# Patient Record
Sex: FEMALE | Race: WHITE | Employment: FULL TIME | URBAN - METROPOLITAN AREA
[De-identification: names, ages, dates, MRNs, and addresses within clinical notes are randomized per-mention and may not be internally consistent; named-entity substitution may affect disease eponyms.]

---

## 2019-01-17 ENCOUNTER — HOSPITAL ENCOUNTER (EMERGENCY)
Age: 29
Discharge: HOME | End: 2019-01-17
Attending: EMERGENCY MEDICINE
Payer: COMMERCIAL

## 2019-01-17 VITALS
TEMPERATURE: 98.3 F | SYSTOLIC BLOOD PRESSURE: 124 MMHG | HEIGHT: 66 IN | HEART RATE: 80 BPM | DIASTOLIC BLOOD PRESSURE: 82 MMHG | BODY MASS INDEX: 23.49 KG/M2 | WEIGHT: 146.16 LBS | OXYGEN SATURATION: 99 % | RESPIRATION RATE: 16 BRPM

## 2019-01-17 DIAGNOSIS — S61.211A LACERATION OF LEFT INDEX FINGER WITHOUT FOREIGN BODY WITHOUT DAMAGE TO NAIL, INITIAL ENCOUNTER: Primary | ICD-10-CM

## 2019-01-17 PROCEDURE — 77030031132 HC SUT NYL COVD -A

## 2019-01-17 PROCEDURE — 75810000294 HC INTERM/LAYERED WND RPR

## 2019-01-17 PROCEDURE — 99283 EMERGENCY DEPT VISIT LOW MDM: CPT

## 2019-01-17 PROCEDURE — 74011250637 HC RX REV CODE- 250/637: Performed by: PHYSICIAN ASSISTANT

## 2019-01-17 PROCEDURE — 74011000250 HC RX REV CODE- 250: Performed by: PHYSICIAN ASSISTANT

## 2019-01-17 PROCEDURE — 77030018836 HC SOL IRR NACL ICUM -A

## 2019-01-17 RX ORDER — BUPIVACAINE HYDROCHLORIDE 5 MG/ML
5 INJECTION, SOLUTION EPIDURAL; INTRACAUDAL
Status: COMPLETED | OUTPATIENT
Start: 2019-01-17 | End: 2019-01-17

## 2019-01-17 RX ORDER — CEPHALEXIN 500 MG/1
500 CAPSULE ORAL 3 TIMES DAILY
Qty: 21 CAP | Refills: 0 | Status: SHIPPED | OUTPATIENT
Start: 2019-01-17 | End: 2019-01-22

## 2019-01-17 RX ORDER — CEPHALEXIN 500 MG/1
500 CAPSULE ORAL
Status: COMPLETED | OUTPATIENT
Start: 2019-01-17 | End: 2019-01-17

## 2019-01-17 RX ADMIN — BUPIVACAINE HYDROCHLORIDE 25 MG: 5 INJECTION, SOLUTION EPIDURAL; INTRACAUDAL; PERINEURAL at 13:10

## 2019-01-17 RX ADMIN — POLYMYXIN B SULFATE, BACITRACIN ZINC, NEOMYCIN SULFATE: 5000; 3.5; 4 OINTMENT TOPICAL at 13:28

## 2019-01-17 RX ADMIN — CEPHALEXIN 500 MG: 500 CAPSULE ORAL at 13:41

## 2019-01-17 NOTE — LETTER
[address] 
[address] [facility] [id number] 
[phone] Work/School Note Date: 1/17/2019 To Whom It May concern: 
 
Prashant Willoughby was seen and treated today in the emergency room by the following provider(s): 
Physician Assistant: Negro Burns PA-C. Prashant Willoughby may return to work on 1/17/19 Riana Conner Sincerely, Beth Ricks PA-C

## 2019-01-17 NOTE — DISCHARGE INSTRUCTIONS
We hope that we have addressed all of your medical concerns. The examination and treatment you received in the Emergency Department were for an emergent problem and were not intended as complete care. It is important that you follow up with your healthcare provider(s) for ongoing care. If your symptoms worsen or do not improve as expected, and you are unable to reach your usual health care provider(s), you should return to the Emergency Department. Today's healthcare is undergoing tremendous change, and patient satisfaction surveys are one of the many tools to assess the quality of medical care. You may receive a survey from the [employer] organization regarding your experience in the Emergency Department. I hope that your experience has been completely positive, particularly the medical care that I provided. As such, please participate in the survey; anything less than excellent does not meet my expectations or intentions. [facility] and [facility] participate in nationally recognized quality of care measures. If your blood pressure is greater than 120/80, as reported below, we urge that you seek medical care to address the potential of high blood pressure, commonly known as hypertension. Hypertension can be hereditary or can be caused by certain medical conditions, pain, stress, or \"white coat syndrome. \"       Please make an appointment with your health care provider(s) for follow up of your Emergency Department visit. VITALS:   Patient Vitals for the past 8 hrs:   Temp Pulse Resp BP SpO2   01/17/19 1159 98.3 °F (36.8 °C) 80 16 124/82 99 %   01/17/19 1119 -- 64 -- -- 98 %          Thank you for allowing us to provide you with medical care today. We realize that you have many choices for your emergency care needs. Please choose us in the future for any continued health care needs. [facility] [place]: [phone]            No results found for this or any previous visit (from the past 24 hour(s)). No results found. Patient Education        Cuts on the Hand Closed With Stitches: Care Instructions  Your Care Instructions    A cut on your hand can be on your fingers, your thumb, or the front or back of your hand. Sometimes a cut can injure the tendons, blood vessels, or nerves of your hand. The doctor used stitches to close the cut. Using stitches also helps the cut heal and reduces scarring. The doctor may have given you a splint to help prevent you from moving your hand, fingers, or thumb. If the cut went deep and through the skin, the doctor put in two layers of stitches. The deeper layer brings the deep part of the cut together. These stitches will dissolve and don't need to be removed. The stitches in the upper layer are the ones you see on the cut. You will probably have a bandage. You will need to have the stitches removed, usually in 7 to 14 days. The doctor may suggest that you see a hand specialist if the cut is very deep or if you have trouble moving your fingers or have less feeling in your hand. The doctor has checked you carefully, but problems can develop later. If you notice any problems or new symptoms, get medical treatment right away. Follow-up care is a key part of your treatment and safety. Be sure to make and go to all appointments, and call your doctor if you are having problems. It's also a good idea to know your test results and keep a list of the medicines you take. How can you care for yourself at home? · Keep the cut dry for the first 24 to 48 hours. After this, you can shower if your doctor okays it. Pat the cut dry. · Don't soak the cut, such as in a bathtub. Your doctor will tell you when it's safe to get the cut wet. · If your doctor told you how to care for your cut, follow your doctor's instructions.  If you did not get instructions, follow this general advice:  ? After the first 24 to 48 hours, wash around the cut with clean water 2 times a day. Don't use hydrogen peroxide or alcohol, which can slow healing. ? You may cover the cut with a thin layer of petroleum jelly, such as Vaseline, and a nonstick bandage. ? Apply more petroleum jelly and replace the bandage as needed. · Prop up the sore hand on a pillow anytime you sit or lie down during the next 3 days. Try to keep it above the level of your heart. This will help reduce swelling. · Avoid any activity that could cause your cut to reopen. · Do not remove the stitches on your own. Your doctor will tell you when to come back to have the stitches removed. · Be safe with medicines. Take pain medicines exactly as directed. ? If the doctor gave you a prescription medicine for pain, take it as prescribed. ? If you are not taking a prescription pain medicine, ask your doctor if you can take an over-the-counter medicine. When should you call for help? Call your doctor now or seek immediate medical care if:    · You have new pain, or your pain gets worse.     · The skin near the cut is cold or pale or changes color.     · You have tingling, weakness, or numbness near the cut.     · The cut starts to bleed, and blood soaks through the bandage. Oozing small amounts of blood is normal.     · You have trouble moving the area of the hand near the cut.     · You have symptoms of infection, such as:  ? Increased pain, swelling, warmth, or redness around the cut.  ? Red streaks leading from the cut.  ? Pus draining from the cut.  ? A fever.    Watch closely for changes in your health, and be sure to contact your doctor if:    · You do not get better as expected. Where can you learn more? Go to http://bay-karoline.info/. Enter T250 in the search box to learn more about \"Cuts on the Hand Closed With Stitches: Care Instructions. \"  Current as of: November 20, 2017  Content Version: 11.8  © 7021-1942 [employer]. Care instructions adapted under license by [employer] (which disclaims liability or warranty for this information). If you have questions about a medical condition or this instruction, always ask your healthcare professional. [address] any warranty or liability for your use of this information.

## 2019-01-17 NOTE — ED NOTES
1220: Patient escorted back to [address] [place] [address] waiting. Charge RN notified of patient need for room for laceration repair and need for finger soaking in [address] [place] [address] waiting.

## 2019-01-17 NOTE — ED PROVIDER NOTES
Leonor Hearn is a 34 y.o. female who presents ambulatory to ER with c/o laceration to left 2nd finger x last evening. States she was cutting cilantro when the knife slipped and she cut her left lateral 2nd finger right at the nailbed. Notes attempted to go to urgent care for sutures however they had just closed and wouldn't see her. States went to [facility] and got quick clot and applied it and held presure for 40mins with successful control of bleeding. States this AM at work started to bleed again and was advised to come to ED for eval. Td UTD. No other complaints. PCP: No primary care provider on file. PMHx significant for: No past medical history on file. PSHx significant for: No past surgical history on file. Allergies not on file There are no other complaints, changes or physical findings at this time. No past medical history on file. No past surgical history on file. No family history on file. Social History Socioeconomic History  Marital status: UNKNOWN Spouse name: Not on file  Number of children: Not on file  Years of education: Not on file  Highest education level: Not on file Social Needs  Financial resource strain: Not on file  Food insecurity - worry: Not on file  Food insecurity - inability: Not on file  Transportation needs - medical: Not on file  Transportation needs - non-medical: Not on file Occupational History  Not on file Tobacco Use  Smoking status: Not on file Substance and Sexual Activity  Alcohol use: Not on file  Drug use: Not on file  Sexual activity: Not on file Other Topics Concern  Not on file Social History Narrative  Not on file ALLERGIES: Patient has no allergy information on record. Review of Systems Constitutional: Negative. HENT: Negative. Eyes: Negative. Respiratory: Negative. Cardiovascular: Negative. Gastrointestinal: Negative. Genitourinary: Negative. Musculoskeletal: Negative. Skin: Positive for wound (laceration L 2nd finger). Neurological: Negative. Hematological: Negative. Psychiatric/Behavioral: Negative. All other systems reviewed and are negative. Vitals:  
 01/17/19 1119 Pulse: 64 SpO2: 98% Physical Exam  
Constitutional: She is oriented to person, place, and time. She appears well-developed and well-nourished. No distress. HENT:  
Head: Normocephalic and atraumatic. Neck: Normal range of motion. Cardiovascular: Intact distal pulses and normal pulses. Musculoskeletal: Normal range of motion. She exhibits no edema or tenderness. Neurological: She is alert and oriented to person, place, and time. She has normal strength. No sensory deficit. Skin: Skin is warm and dry. No rash noted. She is not diaphoretic. No erythema. No pallor. approx 1cm linear laceration to lateral aspect L 2nd finger extending from lateral aspect of nailbed around to palmar aspect overlying distal phalanx. Bleeding controlled. Several large pieces of quick clot embedded in qound. NVI distally, brisk cap refill, FROM all joints L 2nd digit and hand. Psychiatric: She has a normal mood and affect. Her behavior is normal.  
Nursing note and vitals reviewed. MDM Number of Diagnoses or Management Options Laceration of left index finger without foreign body without damage to nail, initial encounter:  
Diagnosis management comments: DDx: laceration, retained FB, secondary infection Amount and/or Complexity of Data Reviewed Discuss the patient with other providers: yes Patient Progress Patient progress: improved Procedures Procedure Note - Digital Block:  
1:11 PM 
Performed by: Shaun Tinsley PA-C  
bupivicaine 0.25% without eipi used to perform digital block of Left index finger(s). The procedure took 1-15 minutes, and pt tolerated well. Procedure Note - Laceration Repair: 
1:11 PM 
Procedure by CHELLE Abdalla Complexity: simple, dirty wound 1cm linear laceration to L index finger  was irrigated copiously with NS under jet lavage, prepped with Chlorprep and draped in a sterile fashion. The area was anesthetized with 3 mLs of  Bupivacaine 0.25% without epinephrine via local infiltration and digital block. The wound was explored with the following results: Foreign bodies found and removed, No tendon laceration seen. The wound was repaired with One layer suture closure: Skin Layer:  2 sutures placed, stitch type:simple interrupted, suture: 4-0 nylon. .  The wound was closed with good hemostasis and approximation. Sterile dressing applied. Estimated blood loss: <2ccs The procedure took 1-15 minutes, and pt tolerated well. 1:37 PM 
Pt has been reevaluated. There are no new complaints, changes, or physical findings at this time. Medications have been reviewed w/ pt and/or family. Pt and/or family's questions have been answered. Pt and/or family expressed good understanding of the dx/tx/rx and is in agreement with plan of care. Pt instructed and agreed to f/u w/ PCP and to return to ED upon further deterioration. Pt is ready for discharge. LABORATORY TESTS: 
No results found for this or any previous visit (from the past 12 hour(s)). IMAGING RESULTS: 
No orders to display No results found. MEDICATIONS GIVEN: 
Medications  
cephALEXin (KEFLEX) capsule 500 mg (not administered) bupivacaine (PF) (MARCAINE) 0.5 % (5 mg/mL) injection 25 mg (25 mg Peripheral Nerve Block Given by Provider 1/17/19 1310)  
neomycin-bacitracin-polymyxin (NEOSPORIN) ointment ( Topical Given 1/17/19 1328) IMPRESSION: 
1. Laceration of left index finger without foreign body without damage to nail, initial encounter PLAN: 
1. Current Discharge Medication List  
  
START taking these medications Details cephALEXin (KEFLEX) 500 mg capsule Take 1 Cap by mouth three (3) times daily for 5 days. Qty: 21 Cap, Refills: 0  
  
  
 
2. Follow-up Information Follow up With Specialties Details Why Contact Info None    None ([phone] Patient stated that they have no PCP [facility] EMERGENCY DEP Emergency Medicine In 1 week For suture removal [address] [id number] 
[phone] Return to ED if worse

## 2019-02-06 ENCOUNTER — OFFICE VISIT (OUTPATIENT)
Dept: NEUROLOGY | Age: 29
End: 2019-02-06

## 2019-02-06 VITALS
SYSTOLIC BLOOD PRESSURE: 112 MMHG | DIASTOLIC BLOOD PRESSURE: 60 MMHG | HEIGHT: 66 IN | BODY MASS INDEX: 23.63 KG/M2 | RESPIRATION RATE: 16 BRPM | WEIGHT: 147 LBS | HEART RATE: 79 BPM | OXYGEN SATURATION: 99 %

## 2019-02-06 DIAGNOSIS — G43.109 MIGRAINE WITH AURA AND WITHOUT STATUS MIGRAINOSUS, NOT INTRACTABLE: Primary | ICD-10-CM

## 2019-02-06 RX ORDER — ONDANSETRON 4 MG/1
4 TABLET, ORALLY DISINTEGRATING ORAL
Qty: 30 TAB | Refills: 1 | Status: SHIPPED | OUTPATIENT
Start: 2019-02-06 | End: 2019-08-06 | Stop reason: SDUPTHER

## 2019-02-06 RX ORDER — ZOLMITRIPTAN 2.5 MG/1
2.5 TABLET, ORALLY DISINTEGRATING ORAL AS NEEDED
Qty: 9 TAB | Refills: 3 | Status: SHIPPED | OUTPATIENT
Start: 2019-02-06 | End: 2019-08-06 | Stop reason: SDUPTHER

## 2019-02-06 RX ORDER — DROSPIRENONE AND ETHINYL ESTRADIOL 0.02-3(28)
KIT ORAL DAILY
COMMUNITY
End: 2019-12-11 | Stop reason: ALTCHOICE

## 2019-02-06 NOTE — PROGRESS NOTES
History of migraines that started at age 14. Worse in spring and summer, but there has been a change. Usually on the right, but in December has one on the left side. Tried a medication that a co-worker had and it worked! Depression screen completed.

## 2019-02-06 NOTE — PROGRESS NOTES
Chief Complaint Patient presents with  Migraine Referred by: Self-referred HPI 
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 She tells me she has had migraine headaches ever since age 14 typically occurring once a year in the spring and summertime described as mainly right-sided headache preceded briefly with aura consisting of vision and speech changes and some paresthesias also on the right side. She has about 15 minutes of time before the headache starts which can become mildly throbbing not always associated with nausea or photophobia but it is worse with movement. She is here because she had a change in her headache occur at the end of December 2018 in which she had the exact duplicate of symptoms but on the left side minus any speech disturbance. This had never happened before but otherwise was almost identical.  She has not had this recur since. She is taking Blanca birth control daily as she has been since age 18. No recent illnesses fevers or chills. She does have a history of one-time childhood seizures around age 5 or 5. Review of Systems Constitutional: Negative for malaise/fatigue. Neurological: Positive for sensory change and headaches. Negative for loss of consciousness. Psychiatric/Behavioral: Negative for memory loss. All other systems reviewed and are negative. Past Medical History:  
Diagnosis Date  Headache Family History Problem Relation Age of Onset  Migraines Mother  Cancer Sister  Migraines Sister  Cancer Maternal Grandmother Social History Socioeconomic History  Marital status: UNKNOWN Spouse name: Not on file  Number of children: Not on file  Years of education: Not on file  Highest education level: Not on file Social Needs  Financial resource strain: Not on file  Food insecurity - worry: Not on file  Food insecurity - inability: Not on file  Transportation needs - medical: Not on file  Transportation needs - non-medical: Not on file Occupational History  Not on file Tobacco Use  Smoking status: Never Smoker  Smokeless tobacco: Never Used Substance and Sexual Activity  Alcohol use: Yes Comment: socially  Drug use: No  
 Sexual activity: Yes  
  Partners: Male Other Topics Concern  Not on file Social History Narrative  Not on file Current Outpatient Medications Medication Sig  
 drospirenone-ethinyl estradiol (ARMANDO) 3-0.02 mg tab Take  by mouth daily.  ZOLMitriptan (ZOMIG-ZMT) 2.5 mg disintegrating tablet Take 1 Tab by mouth as needed for Migraine.  ondansetron (ZOFRAN ODT) 4 mg disintegrating tablet Take 1 Tab by mouth every eight (8) hours as needed for Nausea (with headache). No current facility-administered medications for this visit. No Known Allergies Neurologic Exam  
 
Mental Status Oriented to person, place, and time. Cranial Nerves Cranial nerves II through XII intact. Motor Exam  
Muscle bulk: normal 
 
Strength Strength 5/5 throughout. Sensory Exam  
Light touch normal.  
 
Gait, Coordination, and Reflexes Gait Gait: normal 
 
Coordination Romberg: negative Finger to nose coordination: normal 
 
Reflexes Right brachioradialis: 2+ Left brachioradialis: 2+ Right biceps: 2+ Left biceps: 2+ Right triceps: 2+ Left triceps: 2+ Right patellar: 2+ Left patellar: 2+ Right achilles: 2+ Left achilles: 2+ Physical Exam  
Constitutional: She is oriented to person, place, and time. She appears well-developed and well-nourished. Cardiovascular: Normal rate. Pulmonary/Chest: Effort normal.  
Neurological: She is oriented to person, place, and time. She has normal strength. She has a normal Finger-Nose-Finger Test and a normal Romberg Test. Gait normal.  
Reflex Scores: 
     Tricep reflexes are 2+ on the right side and 2+ on the left side. Bicep reflexes are 2+ on the right side and 2+ on the left side. Brachioradialis reflexes are 2+ on the right side and 2+ on the left side. Patellar reflexes are 2+ on the right side and 2+ on the left side. Achilles reflexes are 2+ on the right side and 2+ on the left side. Skin: Skin is warm and dry. Psychiatric: She has a normal mood and affect. Her behavior is normal.  
Vitals reviewed. Visit Vitals /60 Pulse 79 Resp 16 Ht 5' 6\" (1.676 m) Wt 66.7 kg (147 lb) LMP 01/31/2019 SpO2 99% BMI 23.73 kg/m² No labs Assessment and Plan Diagnoses and all orders for this visit: 
 
1. Migraine with aura and without status migrainosus, not intractable Other orders -     ZOLMitriptan (ZOMIG-ZMT) 2.5 mg disintegrating tablet; Take 1 Tab by mouth as needed for Migraine. 
-     ondansetron (ZOFRAN ODT) 4 mg disintegrating tablet; Take 1 Tab by mouth every eight (8) hours as needed for Nausea (with headache). 19-year-old woman who infrequent migraine with aura. Typically her symptoms are right-sided but she experienced a change for the first time at the end of December almost identical but on the contralateral side. Her exam is unrevealing. No focal asymmetries or hyperreflexia. I suspect this is expected headache change as she gets older particularly in women. Not hearing any red flags nor seeing any abnormalities that I would see just immediate imaging. She agrees. She would like to watch this further. If anything changes she will let me know. She does want to try a triptan for acute therapy which is reasonable. She has no stroke risk factors such as diabetes hypertension or smoking. I did discuss with her briefly that there is a slight increased risk with women who have aura perhaps 1 or 2% but given that fact that she does not have the other risk factors her risk should overall be low.   She agrees to proceed with Zomig and Zofran acutely. I will see her annually or sooner if needed. A notice of this visit/encounter being completed has been sent electronically to the patient's PCP and/or referring provider. [address] [facility],  
NEUROLOGIST Diplomate HUMBERTO

## 2019-02-06 NOTE — PATIENT INSTRUCTIONS
Migraine Headache: Care Instructions Your Care Instructions Migraines are painful, throbbing headaches that often start on one side of the head. They may cause nausea and vomiting and make you sensitive to light, sound, or smell. Without treatment, migraines can last from 4 hours to a few days. Medicines can help prevent migraines or stop them after they have started. Your doctor can help you find which ones work best for you. Follow-up care is a key part of your treatment and safety. Be sure to make and go to all appointments, and call your doctor if you are having problems. It's also a good idea to know your test results and keep a list of the medicines you take. How can you care for yourself at home? · Do not drive if you have taken a prescription pain medicine. · Rest in a quiet, dark room until your headache is gone. Close your eyes, and try to relax or go to sleep. Don't watch TV or read. · Put a cold, moist cloth or cold pack on the painful area for 10 to 20 minutes at a time. Put a thin cloth between the cold pack and your skin. · Use a warm, moist towel or a heating pad set on low to relax tight shoulder and neck muscles. · Have someone gently massage your neck and shoulders. · Take your medicines exactly as prescribed. Call your doctor if you think you are having a problem with your medicine. You will get more details on the specific medicines your doctor prescribes. · Be careful not to take pain medicine more often than the instructions allow. You could get worse or more frequent headaches when the medicine wears off. To prevent migraines · Keep a headache diary so you can figure out what triggers your headaches. Avoiding triggers may help you prevent headaches. Record when each headache began, how long it lasted, and what the pain was like.  (Was it throbbing, aching, stabbing, or dull?) Write down any other symptoms you had with the headache, such as nausea, flashing lights or dark spots, or sensitivity to bright light or loud noise. Note if the headache occurred near your period. List anything that might have triggered the headache. Triggers may include certain foods (chocolate, cheese, wine) or odors, smoke, bright light, stress, or lack of sleep. · If your doctor has prescribed medicine for your migraines, take it as directed. You may have medicine that you take only when you get a migraine and medicine that you take all the time to help prevent migraines. ? If your doctor has prescribed medicine for when you get a headache, take it at the first sign of a migraine, unless your doctor has given you other instructions. ? If your doctor has prescribed medicine to prevent migraines, take it exactly as prescribed. Call your doctor if you think you are having a problem with your medicine. · Find healthy ways to deal with stress. Migraines are most common during or right after stressful times. Take time to relax before and after you do something that has caused a migraine in the past. 
· Try to keep your muscles relaxed by keeping good posture. Check your jaw, face, neck, and shoulder muscles for tension. Try to relax them. When you sit at a desk, change positions often. And make sure to stretch for 30 seconds each hour. · Get plenty of sleep and exercise. · Eat meals on a regular schedule. Avoid foods and drinks that often trigger migraines. These include chocolate, alcohol (especially red wine and port), aspartame, monosodium glutamate (MSG), and some additives found in foods (such as hot dogs, venegas, cold cuts, aged cheeses, and pickled foods). · Limit caffeine. Don't drink too much coffee, tea, or soda. But don't quit caffeine suddenly. That can also give you migraines. · Do not smoke or allow others to smoke around you. If you need help quitting, talk to your doctor about stop-smoking programs and medicines. These can increase your chances of quitting for good. · If you are taking birth control pills or hormone therapy, talk to your doctor about whether they are triggering your migraines. When should you call for help? Call 911 anytime you think you may need emergency care. For example, call if: 
  · You have signs of a stroke. These may include: 
? Sudden numbness, paralysis, or weakness in your face, arm, or leg, especially on only one side of your body. ? Sudden vision changes. ? Sudden trouble speaking. ? Sudden confusion or trouble understanding simple statements. ? Sudden problems with walking or balance. ? A sudden, severe headache that is different from past headaches.  
 Call your doctor now or seek immediate medical care if: 
  · You have new or worse nausea and vomiting.  
  · You have a new or higher fever.  
  · Your headache gets much worse.  
 Watch closely for changes in your health, and be sure to contact your doctor if: 
  · You are not getting better after 2 days (48 hours). Where can you learn more? Go to http://bay-karoline.info/. Enter Q651 in the search box to learn more about \"Migraine Headache: Care Instructions. \" Current as of: Charla 3, 2018 Content Version: 11.9 © 5382-3277 [employer]. Care instructions adapted under license by [employer] (which disclaims liability or warranty for this information). If you have questions about a medical condition or this instruction, always ask your healthcare professional. [place] [address] any warranty or liability for your use of this information.

## 2019-02-18 ENCOUNTER — TELEPHONE (OUTPATIENT)
Dept: NEUROLOGY | Age: 29
End: 2019-02-18

## 2019-02-18 NOTE — TELEPHONE ENCOUNTER
Re: zolmitriptan    Tried to setup PA via CMM and received the following message:  \"Outcome   Additional Information Required   Your PA has been resolved, no additional PA is required. For further inquiries please contact the number on the back of the member prescription card. \"    Called and s/w Neisha Nunn @ [facility] to get clarification. Per Neisha Nunn, there is no PA needed. She ran a test claim for a quantity of 9 for 30 days and said it went through. Called and s/w the pharmacist @ pt's [facility] pharmacy to inform her of what [facility] told me. Per the pharmacist, it appears whoever tried to fill hte medication yesterday \"did not know what they were doing. \" She was able to get the medication to go through the pt's insurance. No PA needed.

## 2019-08-06 ENCOUNTER — OFFICE VISIT (OUTPATIENT)
Dept: NEUROLOGY | Age: 29
End: 2019-08-06

## 2019-08-06 VITALS
HEIGHT: 66 IN | BODY MASS INDEX: 23.78 KG/M2 | WEIGHT: 148 LBS | OXYGEN SATURATION: 98 % | DIASTOLIC BLOOD PRESSURE: 70 MMHG | RESPIRATION RATE: 14 BRPM | HEART RATE: 82 BPM | SYSTOLIC BLOOD PRESSURE: 116 MMHG

## 2019-08-06 DIAGNOSIS — G43.109 MIGRAINE WITH AURA AND WITHOUT STATUS MIGRAINOSUS, NOT INTRACTABLE: Primary | ICD-10-CM

## 2019-08-06 DIAGNOSIS — R00.2 PALPITATIONS: ICD-10-CM

## 2019-08-06 DIAGNOSIS — G47.20 SLEEP PATTERN DISTURBANCE: ICD-10-CM

## 2019-08-06 RX ORDER — ONDANSETRON 4 MG/1
4 TABLET, ORALLY DISINTEGRATING ORAL
Qty: 30 TAB | Refills: 1 | Status: SHIPPED | OUTPATIENT
Start: 2019-08-06

## 2019-08-06 RX ORDER — ZOLMITRIPTAN 2.5 MG/1
2.5 TABLET, ORALLY DISINTEGRATING ORAL AS NEEDED
Qty: 9 TAB | Refills: 3 | Status: SHIPPED | OUTPATIENT
Start: 2019-08-06

## 2019-08-06 NOTE — LETTER
NOTIFICATION RETURN TO WORK  
 
8/6/2019 11:35 AM 
 
Ms. 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 
 
Nieves Javier is currently under the care of [address] [facility]. She is followed by me for persistent severe migraine headaches which tend to be aggravated with alternating day and night shift work. Please consider maintaining a consistent work schedule for her either daytime or nighttime without alternating schedules if possible. Thank you in advance for this consideration. If there are questions or concerns please have the patient contact our office. Sincerely, [address], DO Neurologist

## 2019-08-06 NOTE — PROGRESS NOTES
Pt is here for f/u on her headaches. Zomig is helping. Regular migraines normally on night shift. The bad headaches only 2 since last visit. If having periods of a flutter feeling in her chest with some breathing concerns. It only last for a few seconds, and always at rest. She is aware she needs to find a pcp for this.

## 2019-08-06 NOTE — PROGRESS NOTES
Chief Complaint   Patient presents with    Migraine       HPI    59-year-old woman here to follow-up. She has severe migraine headaches sometimes with hemiplegic symptoms. Preceding aura. Last visit we decided to treat acutely when needed using Zomig during the aura and she is had good results. She is had a couple hemiplegic type migraines breakthrough. Since I saw her last there have been dramatic changes in her work schedule such that she works daytime or nighttime shift sometimes alternating. Because of this she is having more severe headaches. She is also experiencing palpitations which are new. Review of Systems   Eyes: Positive for photophobia. Negative for double vision. Gastrointestinal: Positive for nausea. Neurological: Positive for headaches. Psychiatric/Behavioral: The patient has insomnia. All other systems reviewed and are negative.       Past Medical History:   Diagnosis Date    Headache      Family History   Problem Relation Age of Onset    Migraines Mother     Cancer Sister     Migraines Sister     Cancer Maternal Grandmother      Social History     Socioeconomic History    Marital status: UNKNOWN     Spouse name: Not on file    Number of children: Not on file    Years of education: Not on file    Highest education level: Not on file   Occupational History    Not on file   Social Needs    Financial resource strain: Not on file    Food insecurity:     Worry: Not on file     Inability: Not on file    Transportation needs:     Medical: Not on file     Non-medical: Not on file   Tobacco Use    Smoking status: Never Smoker    Smokeless tobacco: Never Used   Substance and Sexual Activity    Alcohol use: Yes     Comment: socially    Drug use: No    Sexual activity: Yes     Partners: Male   Lifestyle    Physical activity:     Days per week: Not on file     Minutes per session: Not on file    Stress: Not on file   Relationships    Social connections:     Talks on phone: Not on file     Gets together: Not on file     Attends Church service: Not on file     Active member of club or organization: Not on file     Attends meetings of clubs or organizations: Not on file     Relationship status: Not on file    Intimate partner violence:     Fear of current or ex partner: Not on file     Emotionally abused: Not on file     Physically abused: Not on file     Forced sexual activity: Not on file   Other Topics Concern    Not on file   Social History Narrative    Not on file     No Known Allergies      Current Outpatient Medications   Medication Sig    ZOLMitriptan (ZOMIG-ZMT) 2.5 mg disintegrating tablet Take 1 Tab by mouth as needed for Migraine.  ondansetron (ZOFRAN ODT) 4 mg disintegrating tablet Take 1 Tab by mouth every eight (8) hours as needed for Nausea (with headache).  drospirenone-ethinyl estradiol (ARMANDO) 3-0.02 mg tab Take  by mouth daily. No current facility-administered medications for this visit. Neurologic Exam     Mental Status   WD/WN adult in NAD, normal grooming  VSS  A&O x 3    PERRL, nonicteric  Face is symmetric, tongue midline  Speech is fluent and clear  No limb ataxia. No abnl movements. Moving all extemities spontaneously and symmetric  Normal gait    CVS RRR  Lungs nonlabored  Skin is warm and dry         Visit Vitals  /70   Pulse 82   Resp 14   Ht 5' 6\" (1.676 m)   Wt 67.1 kg (148 lb)   LMP 07/09/2019 (Approximate)   SpO2 98%   BMI 23.89 kg/m²       Assessment and Plan   Diagnoses and all orders for this visit:    1. Migraine with aura and without status migrainosus, not intractable    2. Palpitations  -     REFERRAL TO CARDIOLOGY    3. Sleep pattern disturbance    Other orders  -     ZOLMitriptan (ZOMIG-ZMT) 2.5 mg disintegrating tablet; Take 1 Tab by mouth as needed for Migraine.  -     ondansetron (ZOFRAN ODT) 4 mg disintegrating tablet;  Take 1 Tab by mouth every eight (8) hours as needed for Nausea (with headache). 32-year-old woman who has migraine with aura some to hemiplegic symptoms overall stable as far as frequency but having more intense symptoms. I do think she is probably having acute aggravation from shiftwork sleep disorder. In lieu of adding more medication we will try to recommend a more consistent work schedule. I have written a letter on her behalf. If that cannot be accommodated and the headaches continue to get worse we might need to consider adding a prophylactic medication. She is not a good candidate for Topamax due to medication interactions. She is not a good candidate for try cyclic's due to her work schedule. She is not a candidate for cardiac medication given the new symptoms of palpitations. If we had to pursue a preventative medication I would likely consider 1 of the injectable medications. Hopefully her work will be able to accommodate her situation. Keep a headache log. I will see her in follow-up. Greater than 50% of this 20 minute visit was spent counseling about her diagnosis, current symptoms, medications, preparing a letter for her. This clinical note was dictated with an electronic dictation software that can make unintentional errors. If there are any questions, please contact me directly for clarification.           [address] [facility], [facility] [address]  Diplomate HUMBERTO

## 2019-08-07 ENCOUNTER — DOCUMENTATION ONLY (OUTPATIENT)
Dept: NEUROLOGY | Age: 29
End: 2019-08-07

## 2019-08-09 ENCOUNTER — TELEPHONE (OUTPATIENT)
Dept: CARDIOLOGY CLINIC | Age: 29
End: 2019-08-09

## 2019-08-13 NOTE — TELEPHONE ENCOUNTER
Pt was seen by Dr. Leon Sessions 8/12/2019 stated she works Tuesday and Thursday so she couldn't see Dr. Alexandro Boggs.

## 2019-10-31 ENCOUNTER — TELEPHONE (OUTPATIENT)
Dept: NEUROLOGY | Age: 29
End: 2019-10-31

## 2019-11-01 NOTE — TELEPHONE ENCOUNTER
Spoke with pt. She has ADA form that needs to be filled out. Advised her to fill out her portion and then bring to us to complete.

## 2019-12-11 ENCOUNTER — OFFICE VISIT (OUTPATIENT)
Dept: NEUROLOGY | Age: 29
End: 2019-12-11

## 2019-12-11 VITALS
HEART RATE: 67 BPM | SYSTOLIC BLOOD PRESSURE: 102 MMHG | WEIGHT: 144 LBS | DIASTOLIC BLOOD PRESSURE: 66 MMHG | BODY MASS INDEX: 23.14 KG/M2 | RESPIRATION RATE: 14 BRPM | HEIGHT: 66 IN | OXYGEN SATURATION: 99 %

## 2019-12-11 DIAGNOSIS — G43.109 MIGRAINE WITH AURA AND WITHOUT STATUS MIGRAINOSUS, NOT INTRACTABLE: Primary | ICD-10-CM

## 2019-12-11 NOTE — PROGRESS NOTES
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 She has hemiplegic leg migraine manifesting as right-sided weakness affecting vision and speech and thinking. Since I saw her last she is has been able to switch to dayshift almost exclusively and the events have been dramatically less possibly 1 or 2 breakthrough which respond to Zomig. She also stopped taking oral contraceptive and now has a hormonal IUD in place. She is doing well in general.  She has paperwork to be completed for her accommodations in the workplace. Review of Systems   Neurological: Positive for headaches. All other systems reviewed and are negative.       Past Medical History:   Diagnosis Date    Headache      Family History   Problem Relation Age of Onset    Migraines Mother     Cancer Sister     Migraines Sister     Cancer Maternal Grandmother      Social History     Socioeconomic History    Marital status: UNKNOWN     Spouse name: Not on file    Number of children: Not on file    Years of education: Not on file    Highest education level: Not on file   Occupational History    Not on file   Social Needs    Financial resource strain: Not on file    Food insecurity:     Worry: Not on file     Inability: Not on file    Transportation needs:     Medical: Not on file     Non-medical: Not on file   Tobacco Use    Smoking status: Never Smoker    Smokeless tobacco: Never Used   Substance and Sexual Activity    Alcohol use: Yes     Comment: socially    Drug use: No    Sexual activity: Yes     Partners: Male   Lifestyle    Physical activity:     Days per week: Not on file     Minutes per session: Not on file    Stress: Not on file   Relationships    Social connections:     Talks on phone: Not on file     Gets together: Not on file     Attends [employer]ism service: Not on file     Active member of club or organization: Not on file Attends meetings of clubs or organizations: Not on file     Relationship status: Not on file    Intimate partner violence:     Fear of current or ex partner: Not on file     Emotionally abused: Not on file     Physically abused: Not on file     Forced sexual activity: Not on file   Other Topics Concern    Not on file   Social History Narrative    Not on file     No Known Allergies      Current Outpatient Medications   Medication Sig    levonorgestrel (MIRENA) 20 mcg/24 hours (5 yrs) 52 mg IUD 1 Device by IntraUTERine route once.  ZOLMitriptan (ZOMIG-ZMT) 2.5 mg disintegrating tablet Take 1 Tab by mouth as needed for Migraine.  ondansetron (ZOFRAN ODT) 4 mg disintegrating tablet Take 1 Tab by mouth every eight (8) hours as needed for Nausea (with headache). No current facility-administered medications for this visit. Neurologic Exam     Mental Status   WD/WN adult in NAD, normal grooming  VSS  A&O x 3    PERRL, nonicteric  Face is symmetric, tongue midline  Speech is fluent and clear  No limb ataxia. No abnl movements. Moving all extemities spontaneously and symmetric  Normal gait    CVS RRR  Lungs nonlabored  Skin is warm and dry         Visit Vitals  /66   Pulse 67   Resp 14   Ht 5' 6\" (1.676 m)   Wt 65.3 kg (144 lb)   LMP 12/03/2019 (Approximate)   SpO2 99%   BMI 23.24 kg/m²       Assessment and Plan   Diagnoses and all orders for this visit:    1. 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 She does not have status migrainosus. 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 I have completed the paperwork for her on her behalf. We also spoke about birth control in her case.   Patients particularly women who have migraine with aura and who are on hormonal therapy for birth control potentially have a slightly increased risk for stroke in the range of 1 or 2%. She has no other stroke risk factors so I think her risk is low. She understands this. If she has any distinctly different symptoms she will need to come to the hospital to be evaluated immediately. She understands this and expresses agreement. She also understands that being on a triptan like Zomig could potentially exacerbate her symptoms but given the risks and benefits we will continue the medication for now since it is highly effective for her. We will consider changing the medication next visit if we need to if in the event she is using this more than she should. More than 20 minutes of face-to-face time was spent with her personally discussing her conditions, current symptoms, completing paperwork. I reviewed and decided to continue the current medications. This clinical note was dictated with an electronic dictation software that can make unintentional errors. If there are any questions, please contact me directly for clarification.       2 [facility], [facility] [address]  Diplomate HUMBERTO

## 2020-04-28 ENCOUNTER — HOSPITAL ENCOUNTER (OUTPATIENT)
Dept: MRI IMAGING | Age: 30
Discharge: HOME | End: 2020-04-28
Attending: ORTHOPAEDIC SURGERY
Payer: COMMERCIAL

## 2020-04-28 DIAGNOSIS — S69.91XA INJURY OF WRIST, RIGHT, INITIAL ENCOUNTER: ICD-10-CM

## 2020-04-28 DIAGNOSIS — S62.001A: ICD-10-CM

## 2020-04-28 PROCEDURE — 73221 MRI JOINT UPR EXTREM W/O DYE: CPT
